# Patient Record
Sex: MALE | Race: BLACK OR AFRICAN AMERICAN | NOT HISPANIC OR LATINO | Employment: UNEMPLOYED | URBAN - NONMETROPOLITAN AREA
[De-identification: names, ages, dates, MRNs, and addresses within clinical notes are randomized per-mention and may not be internally consistent; named-entity substitution may affect disease eponyms.]

---

## 2023-08-30 ENCOUNTER — HOSPITAL ENCOUNTER (EMERGENCY)
Facility: OTHER | Age: 33
Discharge: HOME | End: 2023-08-30
Attending: STUDENT IN AN ORGANIZED HEALTH CARE EDUCATION/TRAINING PROGRAM | Admitting: STUDENT IN AN ORGANIZED HEALTH CARE EDUCATION/TRAINING PROGRAM
Payer: COMMERCIAL

## 2023-08-30 ENCOUNTER — APPOINTMENT (OUTPATIENT)
Dept: GENERAL RADIOLOGY | Facility: OTHER | Age: 33
End: 2023-08-30
Attending: STUDENT IN AN ORGANIZED HEALTH CARE EDUCATION/TRAINING PROGRAM
Payer: COMMERCIAL

## 2023-08-30 VITALS
HEIGHT: 68 IN | DIASTOLIC BLOOD PRESSURE: 90 MMHG | TEMPERATURE: 98.6 F | SYSTOLIC BLOOD PRESSURE: 129 MMHG | OXYGEN SATURATION: 98 % | HEART RATE: 89 BPM | RESPIRATION RATE: 16 BRPM

## 2023-08-30 DIAGNOSIS — S62.635A CLOSED FRACTURE OF TUFT OF DISTAL PHALANX OF LEFT RING FINGER: ICD-10-CM

## 2023-08-30 PROCEDURE — 99283 EMERGENCY DEPT VISIT LOW MDM: CPT | Performed by: STUDENT IN AN ORGANIZED HEALTH CARE EDUCATION/TRAINING PROGRAM

## 2023-08-30 PROCEDURE — 73140 X-RAY EXAM OF FINGER(S): CPT | Mod: LT

## 2023-08-30 PROCEDURE — 250N000013 HC RX MED GY IP 250 OP 250 PS 637: Performed by: STUDENT IN AN ORGANIZED HEALTH CARE EDUCATION/TRAINING PROGRAM

## 2023-08-30 PROCEDURE — 29130 APPL FINGER SPLINT STATIC: CPT | Mod: F3 | Performed by: STUDENT IN AN ORGANIZED HEALTH CARE EDUCATION/TRAINING PROGRAM

## 2023-08-30 PROCEDURE — 99207 PR NO CHARGE LOS: CPT | Performed by: STUDENT IN AN ORGANIZED HEALTH CARE EDUCATION/TRAINING PROGRAM

## 2023-08-30 RX ORDER — OXYCODONE HYDROCHLORIDE 5 MG/1
5 TABLET ORAL EVERY 6 HOURS PRN
Qty: 6 TABLET | Refills: 0 | Status: SHIPPED | OUTPATIENT
Start: 2023-08-30 | End: 2023-09-02

## 2023-08-30 RX ORDER — ACETAMINOPHEN 325 MG/1
975 TABLET ORAL ONCE
Status: COMPLETED | OUTPATIENT
Start: 2023-08-30 | End: 2023-08-30

## 2023-08-30 RX ORDER — OXYCODONE HYDROCHLORIDE 5 MG/1
10 TABLET ORAL ONCE
Status: COMPLETED | OUTPATIENT
Start: 2023-08-30 | End: 2023-08-30

## 2023-08-30 RX ADMIN — ACETAMINOPHEN 975 MG: 325 TABLET, FILM COATED ORAL at 16:30

## 2023-08-30 RX ADMIN — OXYCODONE HYDROCHLORIDE 10 MG: 5 TABLET ORAL at 16:30

## 2023-08-30 ASSESSMENT — ACTIVITIES OF DAILY LIVING (ADL): ADLS_ACUITY_SCORE: 35

## 2023-08-30 NOTE — ED TRIAGE NOTES
Pt arrives via private vehicle after getting finger slammed in a door about 1540 this afternoon. Pt states finger is throbbing. Pt also having a productive cough only when he lays down at night.      Triage Assessment       Row Name 08/30/23 1610       Triage Assessment (Adult)    Airway WDL WDL       Respiratory WDL    Respiratory WDL X;rhythm/pattern       Skin Circulation/Temperature WDL    Skin Circulation/Temperature WDL WDL       Cardiac WDL    Cardiac WDL WDL       Peripheral/Neurovascular WDL    Peripheral Neurovascular WDL WDL       Cognitive/Neuro/Behavioral WDL    Cognitive/Neuro/Behavioral WDL WDL

## 2023-08-30 NOTE — ED PROVIDER NOTES
"Emergency Department Provider Note  : 1990 Age: 32 year old Sex: male MRN: [id number]    Chief Complaint   Patient presents with    Hand Pain       Medical Decision Making / Assessment / Plan   32 year old male who presents after shutting his left hand fourth digit in a door shortly prior to arrival.  Moderate amount of edema on exam but CMS intact.  Will obtain x-ray and provide analgesia.    ED Course as of 23 1808   Wed Aug 30, 2023   173 X-ray shows terminal tuft fracture.  Will place in prefabricated COT Splint and discharge with analgesia and outpatient follow-up.        The patient was informed of the plan and verbalized understanding and agreed with the plan. The patient was given strict return to Emergency Department precautions as well as appropriate follow up instructions. The patient was discharged in stable condition.    New Prescriptions    OXYCODONE (ROXICODONE) 5 MG TABLET    Take 1 tablet (5 mg) by mouth every 6 hours as needed for severe pain       Final diagnoses:   Closed fracture of tuft of distal phalanx of left ring finger       Raúl Zeng MD  2023   Emergency Department    Lay Hoffman is a 32 year old male with no contributory past medical history who presents with focal pain along the left ring finger after slamming it in a door shortly prior to arrival.  Most of his pain is of the distal phalanx but he has some pain over the middle and proximal phalanx as well.  No pain elsewhere.  No medications taken prior to coming in.  No numbness or tingling or weakness.    I have reviewed the Medications, Allergies, Past Medical and Surgical History, and Social History in the Epic System and with family.    Review of Systems:  Please see HPI for pertinent positives and negatives. All other systems reviewed and found to be negative.      Objective   BP: (!) 142/89  Pulse: 86  Temp: 98  F (36.7  C)  Resp: 16  Height: 172.7 cm (5' 8\")  SpO2: 100 %    Physical Exam: "   Gen: Comfortable. NAD  HEENT: MMM. AT/NC.  Eye: EOMI.   CV: Well perfused left upper extremity.  Pulm: Nonlabored, equal chest rise  Abd: ND.   Ext: Left hand fourth digit with moderate swelling across the distal and middle phalanges with reproducible pain largely in these areas.  No obvious deformity.  Able to flex and extend fully but slowly and with pain.  Neuro: Limited motor exam secondary to pain and edema of the left hand fourth digit but able to flex and extend with good strength.  Psych: Appropriate affect, cognition intact    Procedures / Critical Care   Procedures    Critical Care Time: none         Medical/Surgical History:  History reviewed. No pertinent past medical history.  History reviewed. No pertinent surgical history.    Medications:  No current facility-administered medications for this encounter.     Current Outpatient Medications   Medication    oxyCODONE (ROXICODONE) 5 MG tablet       Allergies:  Patient has no known allergies.    Relevant labs, images, EKGs, Epic and outside hospital (if applicable) charts were reviewed. The findings, diagnosis, plan, and need for follow up were discussed with the patient/family. Nursing notes were reviewed.      Raúl Zeng MD  08/30/23 1619

## 2023-08-30 NOTE — ED NOTES
Reviewed discharge instructions with patient. Hard copy for Oxycodone handed to patient. Patient would like to speak with Dr. Zeng about the amount prescribed to him. Patient expressed he wanted the same dose as he received here.

## 2023-08-30 NOTE — DISCHARGE INSTRUCTIONS
Use ibuprofen Tylenol in addition to the prescribed oxycodone for pain.  Ice your finger frequently over the next 24 hours to help reduce pain and swelling.

## (undated) RX ORDER — ACETAMINOPHEN 325 MG/1
TABLET ORAL
Status: DISPENSED
Start: 2023-08-30

## (undated) RX ORDER — OXYCODONE HYDROCHLORIDE 5 MG/1
TABLET ORAL
Status: DISPENSED
Start: 2023-08-30